# Patient Record
Sex: FEMALE | URBAN - METROPOLITAN AREA
[De-identification: names, ages, dates, MRNs, and addresses within clinical notes are randomized per-mention and may not be internally consistent; named-entity substitution may affect disease eponyms.]

---

## 2018-08-07 ENCOUNTER — APPOINTMENT (RX ONLY)
Dept: URBAN - METROPOLITAN AREA CLINIC 91 | Facility: CLINIC | Age: 58
Setting detail: DERMATOLOGY
End: 2018-08-07

## 2018-08-07 DIAGNOSIS — I83.9 ASYMPTOMATIC VARICOSE VEINS OF LOWER EXTREMITIES: ICD-10-CM

## 2018-08-07 PROBLEM — I83.90 ASYMPTOMATIC VARICOSE VEINS OF UNSPECIFIED LOWER EXTREMITY: Status: ACTIVE | Noted: 2018-08-07

## 2018-08-07 PROCEDURE — ? COSMETIC CONSULTATION: SCLEROTHERAPY

## 2018-08-07 PROCEDURE — ? DIAGNOSIS COMMENT

## 2018-08-07 NOTE — PROCEDURE: DIAGNOSIS COMMENT
Comment: Pt states that she has had a negative LE Doppler within the last year.  Denies any bleeding or clotting disorders.
Detail Level: Simple

## 2018-08-07 NOTE — PROCEDURE: COSMETIC CONSULTATION: SCLEROTHERAPY
Consultation Charge (Use Numbers Only, No Special Characters Or $): 50 Price (Use Numbers Only, No Special Characters Or $): 50
